# Patient Record
Sex: MALE | Employment: OTHER | URBAN - NONMETROPOLITAN AREA
[De-identification: names, ages, dates, MRNs, and addresses within clinical notes are randomized per-mention and may not be internally consistent; named-entity substitution may affect disease eponyms.]

---

## 2024-09-03 ENCOUNTER — LAB (OUTPATIENT)
Dept: LAB | Age: 59
End: 2024-09-03

## 2024-09-03 ENCOUNTER — OFFICE VISIT (OUTPATIENT)
Dept: FAMILY MEDICINE CLINIC | Age: 59
End: 2024-09-03
Payer: COMMERCIAL

## 2024-09-03 VITALS
BODY MASS INDEX: 29.44 KG/M2 | HEART RATE: 98 BPM | OXYGEN SATURATION: 96 % | TEMPERATURE: 98.6 F | WEIGHT: 187.6 LBS | SYSTOLIC BLOOD PRESSURE: 138 MMHG | RESPIRATION RATE: 20 BRPM | HEIGHT: 67 IN | DIASTOLIC BLOOD PRESSURE: 88 MMHG

## 2024-09-03 DIAGNOSIS — Z12.5 SCREENING FOR PROSTATE CANCER: ICD-10-CM

## 2024-09-03 DIAGNOSIS — G47.33 OBSTRUCTIVE SLEEP APNEA SYNDROME: ICD-10-CM

## 2024-09-03 DIAGNOSIS — Z00.00 ROUTINE PHYSICAL EXAMINATION: Primary | ICD-10-CM

## 2024-09-03 PROCEDURE — 99386 PREV VISIT NEW AGE 40-64: CPT | Performed by: NURSE PRACTITIONER

## 2024-09-03 RX ORDER — TADALAFIL 10 MG/1
10 TABLET ORAL PRN
COMMUNITY
End: 2024-09-03 | Stop reason: SDUPTHER

## 2024-09-03 RX ORDER — TADALAFIL 20 MG/1
20 TABLET ORAL PRN
Qty: 30 TABLET | Refills: 0 | Status: SHIPPED | OUTPATIENT
Start: 2024-09-03

## 2024-09-03 SDOH — ECONOMIC STABILITY: FOOD INSECURITY: WITHIN THE PAST 12 MONTHS, YOU WORRIED THAT YOUR FOOD WOULD RUN OUT BEFORE YOU GOT MONEY TO BUY MORE.: NEVER TRUE

## 2024-09-03 SDOH — ECONOMIC STABILITY: FOOD INSECURITY: WITHIN THE PAST 12 MONTHS, THE FOOD YOU BOUGHT JUST DIDN'T LAST AND YOU DIDN'T HAVE MONEY TO GET MORE.: NEVER TRUE

## 2024-09-03 SDOH — ECONOMIC STABILITY: INCOME INSECURITY: HOW HARD IS IT FOR YOU TO PAY FOR THE VERY BASICS LIKE FOOD, HOUSING, MEDICAL CARE, AND HEATING?: NOT HARD AT ALL

## 2024-09-03 ASSESSMENT — ENCOUNTER SYMPTOMS
GASTROINTESTINAL NEGATIVE: 1
RESPIRATORY NEGATIVE: 1
EYES NEGATIVE: 1

## 2024-09-03 ASSESSMENT — PATIENT HEALTH QUESTIONNAIRE - PHQ9
2. FEELING DOWN, DEPRESSED OR HOPELESS: NOT AT ALL
SUM OF ALL RESPONSES TO PHQ QUESTIONS 1-9: 0
SUM OF ALL RESPONSES TO PHQ QUESTIONS 1-9: 0
SUM OF ALL RESPONSES TO PHQ9 QUESTIONS 1 & 2: 0
1. LITTLE INTEREST OR PLEASURE IN DOING THINGS: NOT AT ALL
SUM OF ALL RESPONSES TO PHQ QUESTIONS 1-9: 0
SUM OF ALL RESPONSES TO PHQ QUESTIONS 1-9: 0

## 2024-09-03 NOTE — PROGRESS NOTES
Sukumar Deras is a 58 y.o. male whopresents today for :  Chief Complaint   Patient presents with   • New Patient     Vitals:    09/03/24 1323   BP: 138/88   Pulse: 98   Resp: 20   Temp: 98.6 °F (37 °C)   SpO2: 96%       HPI:     HPI  Patient here as a new patient request routine exam overall healthy 58-year-old male he does esteban rosacea which is new to him over the past year he sees a dermatologist he also has untreated sleep apnea tried using a CPAP but it was not beneficial  There is no problem list on file for this patient.    History reviewed. No pertinent past medical history.   Past Surgical History:   Procedure Laterality Date   • FOOT ARTHROPLASTY Right 12/01/2014    Right foot-removal of arthritis   • FOOT FUSION Right 12/01/2023    Right foot  bone fusion   • ROTATOR CUFF REPAIR Right 12/01/2012     Family History   Problem Relation Age of Onset   • Hypertension Mother    • Cancer Mother         Colon cancer   • No Known Problems Father    • No Known Problems Maternal Grandmother    • No Known Problems Maternal Grandfather    • No Known Problems Paternal Grandmother    • Cancer Paternal Grandfather         Colon cancer     Social History     Tobacco Use   • Smoking status: Never     Passive exposure: Never   • Smokeless tobacco: Never   Substance Use Topics   • Alcohol use: Not on file      Current Outpatient Medications   Medication Sig Dispense Refill   • Multiple Vitamins-Minerals (ONE-A-DAY MENS 50+ PO) Take by mouth     • tadalafil (CIALIS) 20 MG tablet Take 1 tablet by mouth as needed for Erectile Dysfunction 30 tablet 0     No current facility-administered medications for this visit.     No Known Allergies  Health Maintenance   Topic Date Due   • HIV screen  Never done   • Hepatitis C screen  Never done   • Hepatitis B vaccine (1 of 3 - 19+ 3-dose series) Never done   • DTaP/Tdap/Td vaccine (1 - Tdap) Never done   • Diabetes screen  Never done   • Lipids  Never done   • Colorectal Cancer Screen

## 2024-09-04 LAB — PSA SERPL-MCNC: 1 NG/ML (ref 0–1)

## 2024-11-26 ENCOUNTER — OFFICE VISIT (OUTPATIENT)
Dept: PULMONOLOGY | Age: 59
End: 2024-11-26
Payer: COMMERCIAL

## 2024-11-26 VITALS
SYSTOLIC BLOOD PRESSURE: 140 MMHG | HEIGHT: 67 IN | WEIGHT: 191.8 LBS | OXYGEN SATURATION: 91 % | HEART RATE: 94 BPM | DIASTOLIC BLOOD PRESSURE: 82 MMHG | TEMPERATURE: 99 F | BODY MASS INDEX: 30.1 KG/M2

## 2024-11-26 DIAGNOSIS — R06.83 LOUD SNORING: ICD-10-CM

## 2024-11-26 DIAGNOSIS — G47.33 OSA (OBSTRUCTIVE SLEEP APNEA): Primary | ICD-10-CM

## 2024-11-26 DIAGNOSIS — G47.19 EXCESSIVE DAYTIME SLEEPINESS: ICD-10-CM

## 2024-11-26 DIAGNOSIS — J34.89 NASAL OBSTRUCTION: ICD-10-CM

## 2024-11-26 DIAGNOSIS — R06.81 WITNESSED EPISODE OF APNEA: ICD-10-CM

## 2024-11-26 PROCEDURE — 99204 OFFICE O/P NEW MOD 45 MIN: CPT | Performed by: INTERNAL MEDICINE

## 2024-11-26 RX ORDER — DOXYCYCLINE 100 MG/1
CAPSULE ORAL
COMMUNITY
Start: 2024-11-12

## 2024-11-26 NOTE — PROGRESS NOTES
weakness.  Extremities: No edema.  Musculoskeletal: No complaints.  Genitourinary: No complaints.  Hematological: Negative.   Psychiatric/Behavioral: Negative.   Skin: No itching.  Physical Exam:    HEIGHTHeight: 170.2 cm (5' 7\") WEIGHTWeight - Scale: 87 kg (191 lb 12.8 oz)    BMI:  28 Neck Size: 16.5      ESS: 16  SAQLI: 64  Vitals:   Vitals:    11/26/24 0926   BP: (!) 140/82   Pulse: 94   Temp: 99 °F (37.2 °C)   SpO2: 91%         Mallampati Score: 4    Physical Exam :  Constitutional: Moderately built and moderately nourished. No distress.  HENT:   Head: Normocephalic and atraumatic.   Mouth/Throat: Oropharynx is clear and moist. No oral thrush. Mallampati 4, enlarged uvula   Eyes: Conjunctivae are normal. PERRLA. No scleral icterus.   Neck: Neck supple. No JVD present. No tracheal deviation present.   Cardiovascular: Normal rate, regular rhythm, normal heart sounds. No murmur heard.   Pulmonary/Chest: Effort normal and breath sounds normal. No stridor. No respiratory distress.  No wheezes. No rales.   Abdominal: Soft. No distension. No tenderness.   Musculoskeletal: Normal range of motion.   Lymphadenopathy:  No cervical adenopathy.   Neurological: Alert and oriented to person, place, and time. No focal deficits.  Skin: Skin is warm and dry. Patient is not diaphoretic.   Psychiatric: Normal behavior with normal mood and affect.    Diagnostic Data:                          Assessment    Diagnosis Orders   1. MIRTHA (obstructive sleep apnea)  Home Sleep Study      2. Excessive daytime sleepiness  Home Sleep Study      3. Witnessed episode of apnea        4. Loud snoring        5. Nasal obstruction          Very symptomatic obstructive sleep apnea under no therapy, about 15 pound weight gain from the time the nasal study was done, nasal obstruction could have contributed to intolerance to PAP, we discussed about alternative therapies to PAP including hypoglossal nerve stimulator.    I advised patient to avoid drowsy

## 2024-12-23 RX ORDER — TADALAFIL 20 MG/1
20 TABLET ORAL PRN
Qty: 30 TABLET | Refills: 0 | Status: SHIPPED | OUTPATIENT
Start: 2024-12-23

## 2025-01-14 ENCOUNTER — HOSPITAL ENCOUNTER (OUTPATIENT)
Dept: SLEEP CENTER | Age: 60
Discharge: HOME | End: 2025-01-16
Payer: COMMERCIAL

## 2025-01-14 DIAGNOSIS — G47.19 EXCESSIVE DAYTIME SLEEPINESS: ICD-10-CM

## 2025-01-14 DIAGNOSIS — G47.33 OSA (OBSTRUCTIVE SLEEP APNEA): ICD-10-CM

## 2025-01-14 PROCEDURE — 95806 SLEEP STUDY UNATT&RESP EFFT: CPT

## 2025-01-14 NOTE — PROGRESS NOTES
Sukumar presents today for a HST instruction and demonstration on unit # 2325. Questions were asked and answers given.  He was able to return demonstration and verbalized understanding.  The sleep center control room phone number was provided in case questions arise during the study. Informed patient to call 911 in case of an emergency.   He states he will return the unit tomorrow before 1000.                       Title:  Home Sleep Apnea Testing (HSAT)     Approved by:  Ange Hernandez MD        Approval Date:   March, 2024 Next Review:   March, 2026       Responsible Party:  Sonia Parikh  Institution/Entities Applies to:    [facility]    Policy Number:  None      Document Type:  Such as Guideline, Policy,   Policy & Procedure, or Procedure, Instructions   Manual:  Policy and Procedures     Section: IV  Policy Start Date: June, 2011       HOME SLEEP APNEA TESTING (HSAT)      PURPOSE: To ensure home sleep apnea testing (HSAT) conducted by the sleep facility adheres to the current AASM practice parameters, clinical practice guidelines, best practice and clinical guidelines in regard to the diagnosis of MIRTHA in adults.       POLICY:      HSAT is a method of recording certain parameters which will target and measure, minimally, heart rate, oxygen saturation, respiratory airflow, respiratory effort and snoring for the purpose of evaluating a patient for MIRTHA.       HSAT will be performed in conjunction with a comprehensive sleep evaluation by an appropriately licensed sleep facility medical staff member. All portable monitoring equipment will be FDA-approved and appropriately maintained to ensure patient safety and efficiency of the test.       PROCEDURE:      An order from a licensed sleep physician along with appropriate medical history documenting the indication for HSAT that complies with the AASM practice parameters.    All tests will be performed, and records will be

## 2025-02-17 ENCOUNTER — OFFICE VISIT (OUTPATIENT)
Dept: PULMONOLOGY | Age: 60
End: 2025-02-17
Payer: COMMERCIAL

## 2025-02-17 VITALS
HEART RATE: 89 BPM | BODY MASS INDEX: 29.35 KG/M2 | WEIGHT: 187 LBS | OXYGEN SATURATION: 95 % | TEMPERATURE: 97.9 F | DIASTOLIC BLOOD PRESSURE: 84 MMHG | HEIGHT: 67 IN | SYSTOLIC BLOOD PRESSURE: 130 MMHG

## 2025-02-17 DIAGNOSIS — G47.33 OSA (OBSTRUCTIVE SLEEP APNEA): Primary | ICD-10-CM

## 2025-02-17 DIAGNOSIS — G47.19 EXCESSIVE DAYTIME SLEEPINESS: ICD-10-CM

## 2025-02-17 DIAGNOSIS — J34.89 NASAL OBSTRUCTION: ICD-10-CM

## 2025-02-17 PROCEDURE — 99214 OFFICE O/P EST MOD 30 MIN: CPT | Performed by: INTERNAL MEDICINE

## 2025-02-17 RX ORDER — FLUTICASONE PROPIONATE 50 MCG
2 SPRAY, SUSPENSION (ML) NASAL DAILY
Qty: 16 G | Refills: 6 | Status: SHIPPED | OUTPATIENT
Start: 2025-02-17 | End: 2026-02-12

## 2025-02-17 NOTE — PROGRESS NOTES
Sleep Medicine    Sukumar Deras, 59 y.o.  [id number]    Nurses Notes   Follow up after HST   Study Results    Initial Study Date -  01/14/25  AHI -  47.3    Total Events - 308  (Apneas  74  Hypopneas 234  Central     (Total Sleep Time - 391 min)  Time with Sats below 88% -  min  Oxygen level - Room Air  38.1    Neck  16.5                  Mal 4  SAQLI 16            ESS  64      INTERVAL HISTORY         Sukumar Deras is a 59 y.o. old male who comes in to review the results of his recent sleep study, to answer questions and to explore options for treatment.    PMH  History reviewed. No pertinent past medical history.  Past Surgical History:   Procedure Laterality Date    FOOT ARTHROPLASTY Right 12/01/2014    Right foot-removal of arthritis    FOOT FUSION Right 12/01/2023    Right foot  bone fusion    ROTATOR CUFF REPAIR Right 12/01/2012     Social History     Tobacco Use    Smoking status: Never     Passive exposure: Never    Smokeless tobacco: Never   Vaping Use    Vaping status: Never Used     Family History   Problem Relation Age of Onset    Hypertension Mother     Cancer Mother         Colon cancer    No Known Problems Father     No Known Problems Maternal Grandmother     No Known Problems Maternal Grandfather     No Known Problems Paternal Grandmother     Cancer Paternal Grandfather         Colon cancer       ALLERGIES  No Known Allergies    MEDS  Current Outpatient Medications   Medication Sig Dispense Refill    fluticasone (FLONASE) 50 MCG/ACT nasal spray 2 sprays by Nasal route daily 16 g 6    tadalafil (CIALIS) 20 MG tablet Take 1 tablet by mouth as needed for Erectile Dysfunction 30 tablet 0    doxycycline hyclate (VIBRAMYCIN) 100 MG capsule       Multiple Vitamins-Minerals (ONE-A-DAY MENS 50+ PO) Take by mouth       No current facility-administered medications for this visit.       EXAM  Vitals -  /84 (Site: Right Upper Arm, Position: Sitting, Cuff Size: Medium Adult)   Pulse 89   Temp 97.9 °F

## 2025-02-19 ENCOUNTER — TELEPHONE (OUTPATIENT)
Dept: PULMONOLOGY | Age: 60
End: 2025-02-19

## 2025-02-19 NOTE — TELEPHONE ENCOUNTER
Myron from [facility] called in stating the patient insurance is not in network with them that the patient order needs sent somewhere else. Called and spoke with patient about where we could send his order and he went with [facility]. Faxing everything today.

## 2025-02-20 ENCOUNTER — TELEPHONE (OUTPATIENT)
Dept: PULMONOLOGY | Age: 60
End: 2025-02-20

## 2025-02-20 DIAGNOSIS — G47.33 OSA (OBSTRUCTIVE SLEEP APNEA): Primary | ICD-10-CM

## 2025-02-20 NOTE — TELEPHONE ENCOUNTER
Patients wife called in ... They are looking for more options outside of cpap usage and wanted to know if you had any recommendations such as oral appliance ? Please advise, thank you !

## 2025-03-04 RX ORDER — TADALAFIL 20 MG/1
20 TABLET ORAL PRN
Qty: 30 TABLET | Refills: 0 | Status: SHIPPED | OUTPATIENT
Start: 2025-03-04

## 2025-03-24 ENCOUNTER — TELEPHONE (OUTPATIENT)
Dept: PULMONOLOGY | Age: 60
End: 2025-03-24

## 2025-03-24 NOTE — TELEPHONE ENCOUNTER
FYI - Received fax from Becky at [facility].  States pt never returned call to schedule PAP setup appt.  Thank you.

## 2025-04-21 ENCOUNTER — TELEPHONE (OUTPATIENT)
Age: 60
End: 2025-04-21

## 2025-04-21 NOTE — TELEPHONE ENCOUNTER
Sukumar is wanting to know if there is a waiting period before he is seen by you.  He just got his oral appliance 1 wk ago. Does he need to have another sleep study to see if the oral appliance is working? Wife states he is not snoring anymore. Please advise. Thank you

## 2025-04-21 NOTE — TELEPHONE ENCOUNTER
Called to Sukumar. We did move his appt up. He wanted a couple more week with the oral appliance. Thank you

## 2025-04-21 NOTE — TELEPHONE ENCOUNTER
Chart reviewed, pt has severe MIRTHA, I do recommend repeat sleep study but since I have never seen pt and the recommended treatment by Dr Parson was PAP therapy , we need a visit to discuss symptoms and testing if needed.

## 2025-04-23 RX ORDER — TADALAFIL 20 MG/1
20 TABLET ORAL DAILY PRN
Qty: 30 TABLET | Refills: 0 | Status: SHIPPED | OUTPATIENT
Start: 2025-04-23

## 2025-04-23 NOTE — TELEPHONE ENCOUNTER
Last visit- 9/3/2024  Next visit- Visit date not found    Requested Prescriptions     Pending Prescriptions Disp Refills    tadalafil (CIALIS) 20 MG tablet [Pharmacy Med Name: TADALAFIL 20 MG TABLET] 30 tablet 0     Sig: TAKE 1 TABLET BY MOUTH DAILY AS NEEDED FOR ERECTILE DYSFUNCTION

## 2025-05-15 ENCOUNTER — OFFICE VISIT (OUTPATIENT)
Age: 60
End: 2025-05-15
Payer: COMMERCIAL

## 2025-05-15 VITALS
SYSTOLIC BLOOD PRESSURE: 122 MMHG | HEIGHT: 67 IN | HEART RATE: 98 BPM | TEMPERATURE: 98.7 F | OXYGEN SATURATION: 93 % | WEIGHT: 183 LBS | DIASTOLIC BLOOD PRESSURE: 76 MMHG | BODY MASS INDEX: 28.72 KG/M2

## 2025-05-15 DIAGNOSIS — Z78.9 INTOLERANCE OF CONTINUOUS POSITIVE AIRWAY PRESSURE (CPAP) VENTILATION: ICD-10-CM

## 2025-05-15 DIAGNOSIS — G47.33 OSA (OBSTRUCTIVE SLEEP APNEA): Primary | ICD-10-CM

## 2025-05-15 DIAGNOSIS — R06.83 SNORING: ICD-10-CM

## 2025-05-15 DIAGNOSIS — G47.19 EXCESSIVE DAYTIME SLEEPINESS: ICD-10-CM

## 2025-05-15 PROCEDURE — 99214 OFFICE O/P EST MOD 30 MIN: CPT | Performed by: NURSE PRACTITIONER

## 2025-05-15 NOTE — PROGRESS NOTES
[facility]       Sukumar Deras         [id number]  5/15/2025   Chief Complaint   Patient presents with    Follow-up     3 month MIRTHA with oral appliance         Pt of Dr. Dereck KELLEY Download:   Original or initial AHI: 47.3     Date of initial study: 1.14.25    Neck Size: 16.5  Mallampati 4  ESS:  10  SAQLI: 70    Here is a scan of the most recent download:  N/A      Results  Testing  AHI (Apnea-Hypopnea Index) is 47.3.  '  Presentation:   History of Present Illness  The patient is a 59-year-old male who presents for evaluation of sleep apnea. He is accompanied by his wife.    He was diagnosed with sleep apnea approximately 10 years ago and was prescribed a CPAP machine. However, he found the device intolerable and discontinued its use. His condition has since deteriorated, prompting a recent re-evaluation that revealed severe sleep apnea, a significant worsening from his initial diagnosis.   He was informed about the Inspire device as a potential treatment option but was advised to attempt CPAP therapy again. Again, he did not tolerate use and called office regarding other options and was prescribed an oral appliance    He continues to experience symptoms, although they are less severe than before. His wife reports a noticeable improvement in his snoring, but she expresses concern about his breathing during sleep.   She recalls an incident where she had to physically check if he was breathing due to the absence of snoring.   He is a mouth breather and was initially provided with a nasal mask, which proved ineffective.      He also has a large uvula    FAMILY HISTORY  His brother had sleep apnea and used an oral appliance.      Physical Exam:    BMI:  Body mass index is 28.66 kg/m².    Wt Readings from Last 3 Encounters:   05/15/25 83 kg (183 lb)   02/17/25 84.8 kg (187 lb)   11/26/24 87 kg (191 lb 12.8 oz)     Weight stable / unchanged  Vitals: /76 (BP Site: Right Upper Arm, Patient  no chest pain and no edema.

## 2025-06-05 ENCOUNTER — TELEPHONE (OUTPATIENT)
Dept: SLEEP CENTER | Age: 60
End: 2025-06-05

## 2025-06-05 NOTE — TELEPHONE ENCOUNTER
Please schedule a f/u appt for Sukumar.  His HST is scheduled for tomorrow, 06/06/25.        Thank you,  Anisha

## 2025-06-06 ENCOUNTER — HOSPITAL ENCOUNTER (OUTPATIENT)
Dept: SLEEP CENTER | Age: 60
Discharge: HOME | End: 2025-06-08
Payer: COMMERCIAL

## 2025-06-06 DIAGNOSIS — G47.33 OSA (OBSTRUCTIVE SLEEP APNEA): ICD-10-CM

## 2025-06-06 PROCEDURE — 95806 SLEEP STUDY UNATT&RESP EFFT: CPT

## 2025-06-06 NOTE — PROGRESS NOTES
Sukumar presents today for a HST instruction and demonstration on unit # 2325. Questions were asked and answers given.  He was able to return demonstration and verbalized understanding.  The sleep center control room phone number was provided in case questions arise during the study. Informed patient to call 911 in case of an emergency.   He states he will return the unit tomorrow before 1000.                       Title:  Home Sleep Apnea Testing (HSAT)     Approved by:  Ange Hernandez MD        Approval Date:   March, 2024 Next Review:   March, 2026       Responsible Party:  Sonia Parikh  Institution/Entities Applies to:    [facility]    Policy Number:  None      Document Type:  Such as Guideline, Policy,   Policy & Procedure, or Procedure, Instructions   Manual:  Policy and Procedures     Section: IV  Policy Start Date: June, 2011       HOME SLEEP APNEA TESTING (HSAT)      PURPOSE: To ensure home sleep apnea testing (HSAT) conducted by the sleep facility adheres to the current AASM practice parameters, clinical practice guidelines, best practice and clinical guidelines in regard to the diagnosis of MIRTHA in adults.       POLICY:      HSAT is a method of recording certain parameters which will target and measure, minimally, heart rate, oxygen saturation, respiratory airflow, respiratory effort and snoring for the purpose of evaluating a patient for MIRTHA.       HSAT will be performed in conjunction with a comprehensive sleep evaluation by an appropriately licensed sleep facility medical staff member. All portable monitoring equipment will be FDA-approved and appropriately maintained to ensure patient safety and efficiency of the test.       PROCEDURE:      An order from a licensed sleep physician along with appropriate medical history documenting the indication for HSAT that complies with the AASM practice parameters.    All tests will be performed, and records will be

## 2025-06-10 ENCOUNTER — RESULTS FOLLOW-UP (OUTPATIENT)
Dept: PULMONOLOGY | Age: 60
End: 2025-06-10

## 2025-06-30 RX ORDER — TADALAFIL 20 MG/1
20 TABLET ORAL DAILY PRN
Qty: 30 TABLET | Refills: 0 | Status: SHIPPED | OUTPATIENT
Start: 2025-06-30

## 2025-08-04 RX ORDER — TADALAFIL 20 MG/1
20 TABLET ORAL DAILY PRN
Qty: 90 TABLET | Refills: 1 | Status: SHIPPED | OUTPATIENT
Start: 2025-08-04

## 2025-08-29 ENCOUNTER — OFFICE VISIT (OUTPATIENT)
Age: 60
End: 2025-08-29

## 2025-08-29 VITALS
OXYGEN SATURATION: 95 % | HEIGHT: 67 IN | DIASTOLIC BLOOD PRESSURE: 76 MMHG | WEIGHT: 185 LBS | SYSTOLIC BLOOD PRESSURE: 132 MMHG | TEMPERATURE: 98.2 F | HEART RATE: 77 BPM | BODY MASS INDEX: 29.03 KG/M2

## 2025-08-29 DIAGNOSIS — J30.2 SEASONAL ALLERGIES: ICD-10-CM

## 2025-08-29 DIAGNOSIS — Z78.9 INTOLERANCE OF CONTINUOUS POSITIVE AIRWAY PRESSURE (CPAP) VENTILATION: ICD-10-CM

## 2025-08-29 DIAGNOSIS — G47.33 SEVERE OBSTRUCTIVE SLEEP APNEA: Primary | ICD-10-CM

## 2025-08-29 DIAGNOSIS — G47.19 EXCESSIVE DAYTIME SLEEPINESS: ICD-10-CM

## 2025-08-29 DIAGNOSIS — K21.9 GERD WITH APNEA: ICD-10-CM

## 2025-08-29 DIAGNOSIS — R06.81 GERD WITH APNEA: ICD-10-CM

## 2025-08-29 DIAGNOSIS — R06.83 SNORING: ICD-10-CM
